# Patient Record
Sex: MALE | Race: WHITE | NOT HISPANIC OR LATINO | URBAN - METROPOLITAN AREA
[De-identification: names, ages, dates, MRNs, and addresses within clinical notes are randomized per-mention and may not be internally consistent; named-entity substitution may affect disease eponyms.]

---

## 2020-11-26 ENCOUNTER — EMERGENCY (EMERGENCY)
Facility: HOSPITAL | Age: 30
LOS: 1 days | End: 2020-11-26
Attending: EMERGENCY MEDICINE
Payer: COMMERCIAL

## 2020-11-26 VITALS
WEIGHT: 160.06 LBS | HEIGHT: 67 IN | HEART RATE: 102 BPM | TEMPERATURE: 100 F | SYSTOLIC BLOOD PRESSURE: 130 MMHG | RESPIRATION RATE: 16 BRPM | DIASTOLIC BLOOD PRESSURE: 88 MMHG | OXYGEN SATURATION: 99 %

## 2020-11-26 VITALS
TEMPERATURE: 100 F | HEART RATE: 87 BPM | SYSTOLIC BLOOD PRESSURE: 122 MMHG | RESPIRATION RATE: 16 BRPM | DIASTOLIC BLOOD PRESSURE: 80 MMHG | OXYGEN SATURATION: 98 %

## 2020-11-26 LAB
ALBUMIN SERPL ELPH-MCNC: 4.3 G/DL — SIGNIFICANT CHANGE UP (ref 3.3–5)
ALP SERPL-CCNC: 55 U/L — SIGNIFICANT CHANGE UP (ref 40–120)
ALT FLD-CCNC: 31 U/L — SIGNIFICANT CHANGE UP (ref 12–78)
ANION GAP SERPL CALC-SCNC: 7 MMOL/L — SIGNIFICANT CHANGE UP (ref 5–17)
APPEARANCE UR: CLEAR — SIGNIFICANT CHANGE UP
APTT BLD: 32.2 SEC — SIGNIFICANT CHANGE UP (ref 27.5–35.5)
AST SERPL-CCNC: 20 U/L — SIGNIFICANT CHANGE UP (ref 15–37)
BACTERIA # UR AUTO: ABNORMAL
BASOPHILS # BLD AUTO: 0 K/UL — SIGNIFICANT CHANGE UP (ref 0–0.2)
BASOPHILS NFR BLD AUTO: 0 % — SIGNIFICANT CHANGE UP (ref 0–2)
BILIRUB SERPL-MCNC: 0.5 MG/DL — SIGNIFICANT CHANGE UP (ref 0.2–1.2)
BILIRUB UR-MCNC: NEGATIVE — SIGNIFICANT CHANGE UP
BUN SERPL-MCNC: 14 MG/DL — SIGNIFICANT CHANGE UP (ref 7–23)
CALCIUM SERPL-MCNC: 8.6 MG/DL — SIGNIFICANT CHANGE UP (ref 8.5–10.1)
CHLORIDE SERPL-SCNC: 104 MMOL/L — SIGNIFICANT CHANGE UP (ref 96–108)
CK MB CFR SERPL CALC: <1 NG/ML — SIGNIFICANT CHANGE UP (ref 0–3.6)
CO2 SERPL-SCNC: 26 MMOL/L — SIGNIFICANT CHANGE UP (ref 22–31)
COLOR SPEC: YELLOW — SIGNIFICANT CHANGE UP
CREAT SERPL-MCNC: 1.4 MG/DL — HIGH (ref 0.5–1.3)
DIFF PNL FLD: ABNORMAL
EOSINOPHIL # BLD AUTO: 0 K/UL — SIGNIFICANT CHANGE UP (ref 0–0.5)
EOSINOPHIL NFR BLD AUTO: 0 % — SIGNIFICANT CHANGE UP (ref 0–6)
EPI CELLS # UR: SIGNIFICANT CHANGE UP
GLUCOSE SERPL-MCNC: 100 MG/DL — HIGH (ref 70–99)
GLUCOSE UR QL: NEGATIVE — SIGNIFICANT CHANGE UP
HCT VFR BLD CALC: 40.2 % — SIGNIFICANT CHANGE UP (ref 39–50)
HGB BLD-MCNC: 14.3 G/DL — SIGNIFICANT CHANGE UP (ref 13–17)
INR BLD: 1.3 RATIO — HIGH (ref 0.88–1.16)
KETONES UR-MCNC: ABNORMAL
LACTATE SERPL-SCNC: 1.1 MMOL/L — SIGNIFICANT CHANGE UP (ref 0.7–2)
LEUKOCYTE ESTERASE UR-ACNC: NEGATIVE — SIGNIFICANT CHANGE UP
LYMPHOCYTES # BLD AUTO: 0.4 K/UL — LOW (ref 1–3.3)
LYMPHOCYTES # BLD AUTO: 8 % — LOW (ref 13–44)
MAGNESIUM SERPL-MCNC: 2.2 MG/DL — SIGNIFICANT CHANGE UP (ref 1.6–2.6)
MANUAL SMEAR VERIFICATION: SIGNIFICANT CHANGE UP
MCHC RBC-ENTMCNC: 31.2 PG — SIGNIFICANT CHANGE UP (ref 27–34)
MCHC RBC-ENTMCNC: 35.6 GM/DL — SIGNIFICANT CHANGE UP (ref 32–36)
MCV RBC AUTO: 87.8 FL — SIGNIFICANT CHANGE UP (ref 80–100)
MONOCYTES # BLD AUTO: 0.5 K/UL — SIGNIFICANT CHANGE UP (ref 0–0.9)
MONOCYTES NFR BLD AUTO: 10 % — SIGNIFICANT CHANGE UP (ref 2–14)
NEUTROPHILS # BLD AUTO: 4 K/UL — SIGNIFICANT CHANGE UP (ref 1.8–7.4)
NEUTROPHILS NFR BLD AUTO: 67 % — SIGNIFICANT CHANGE UP (ref 43–77)
NEUTS BAND # BLD: 13 % — HIGH (ref 0–8)
NITRITE UR-MCNC: NEGATIVE — SIGNIFICANT CHANGE UP
NRBC # BLD: 0 — SIGNIFICANT CHANGE UP
NRBC # BLD: SIGNIFICANT CHANGE UP /100 WBCS (ref 0–0)
PH UR: 6 — SIGNIFICANT CHANGE UP (ref 5–8)
PLAT MORPH BLD: NORMAL — SIGNIFICANT CHANGE UP
PLATELET # BLD AUTO: 162 K/UL — SIGNIFICANT CHANGE UP (ref 150–400)
POTASSIUM SERPL-MCNC: 3.7 MMOL/L — SIGNIFICANT CHANGE UP (ref 3.5–5.3)
POTASSIUM SERPL-SCNC: 3.7 MMOL/L — SIGNIFICANT CHANGE UP (ref 3.5–5.3)
PROT SERPL-MCNC: 7.9 G/DL — SIGNIFICANT CHANGE UP (ref 6–8.3)
PROT UR-MCNC: 15
PROTHROM AB SERPL-ACNC: 15 SEC — HIGH (ref 10.6–13.6)
RAPID RVP RESULT: SIGNIFICANT CHANGE UP
RBC # BLD: 4.58 M/UL — SIGNIFICANT CHANGE UP (ref 4.2–5.8)
RBC # FLD: 11.8 % — SIGNIFICANT CHANGE UP (ref 10.3–14.5)
RBC BLD AUTO: NORMAL — SIGNIFICANT CHANGE UP
RBC CASTS # UR COMP ASSIST: SIGNIFICANT CHANGE UP /HPF (ref 0–4)
S PYO DNA THROAT QL NAA+PROBE: SIGNIFICANT CHANGE UP
SARS-COV-2 RNA SPEC QL NAA+PROBE: SIGNIFICANT CHANGE UP
SODIUM SERPL-SCNC: 137 MMOL/L — SIGNIFICANT CHANGE UP (ref 135–145)
SP GR SPEC: 1.02 — SIGNIFICANT CHANGE UP (ref 1.01–1.02)
TROPONIN I SERPL-MCNC: <.015 NG/ML — SIGNIFICANT CHANGE UP (ref 0.01–0.04)
TSH SERPL-MCNC: 0.96 UIU/ML — SIGNIFICANT CHANGE UP (ref 0.36–3.74)
UROBILINOGEN FLD QL: NEGATIVE — SIGNIFICANT CHANGE UP
VARIANT LYMPHS # BLD: 2 % — SIGNIFICANT CHANGE UP (ref 0–6)
WBC # BLD: 5 K/UL — SIGNIFICANT CHANGE UP (ref 3.8–10.5)
WBC # FLD AUTO: 5 K/UL — SIGNIFICANT CHANGE UP (ref 3.8–10.5)
WBC UR QL: SIGNIFICANT CHANGE UP

## 2020-11-26 PROCEDURE — 85025 COMPLETE CBC W/AUTO DIFF WBC: CPT

## 2020-11-26 PROCEDURE — 83605 ASSAY OF LACTIC ACID: CPT

## 2020-11-26 PROCEDURE — 87086 URINE CULTURE/COLONY COUNT: CPT

## 2020-11-26 PROCEDURE — 84443 ASSAY THYROID STIM HORMONE: CPT

## 2020-11-26 PROCEDURE — 85610 PROTHROMBIN TIME: CPT

## 2020-11-26 PROCEDURE — 84484 ASSAY OF TROPONIN QUANT: CPT

## 2020-11-26 PROCEDURE — 71045 X-RAY EXAM CHEST 1 VIEW: CPT

## 2020-11-26 PROCEDURE — 71045 X-RAY EXAM CHEST 1 VIEW: CPT | Mod: 26

## 2020-11-26 PROCEDURE — 93010 ELECTROCARDIOGRAM REPORT: CPT

## 2020-11-26 PROCEDURE — 83735 ASSAY OF MAGNESIUM: CPT

## 2020-11-26 PROCEDURE — 96360 HYDRATION IV INFUSION INIT: CPT

## 2020-11-26 PROCEDURE — 82553 CREATINE MB FRACTION: CPT

## 2020-11-26 PROCEDURE — 93005 ELECTROCARDIOGRAM TRACING: CPT

## 2020-11-26 PROCEDURE — 81001 URINALYSIS AUTO W/SCOPE: CPT

## 2020-11-26 PROCEDURE — 87798 DETECT AGENT NOS DNA AMP: CPT

## 2020-11-26 PROCEDURE — 87040 BLOOD CULTURE FOR BACTERIA: CPT

## 2020-11-26 PROCEDURE — 87651 STREP A DNA AMP PROBE: CPT

## 2020-11-26 PROCEDURE — 80053 COMPREHEN METABOLIC PANEL: CPT

## 2020-11-26 PROCEDURE — 36415 COLL VENOUS BLD VENIPUNCTURE: CPT

## 2020-11-26 PROCEDURE — 99284 EMERGENCY DEPT VISIT MOD MDM: CPT | Mod: 25

## 2020-11-26 PROCEDURE — 99284 EMERGENCY DEPT VISIT MOD MDM: CPT

## 2020-11-26 PROCEDURE — 85730 THROMBOPLASTIN TIME PARTIAL: CPT

## 2020-11-26 PROCEDURE — 0225U NFCT DS DNA&RNA 21 SARSCOV2: CPT

## 2020-11-26 RX ORDER — ACETAMINOPHEN 500 MG
650 TABLET ORAL ONCE
Refills: 0 | Status: COMPLETED | OUTPATIENT
Start: 2020-11-26 | End: 2020-11-26

## 2020-11-26 RX ORDER — SODIUM CHLORIDE 9 MG/ML
1000 INJECTION INTRAMUSCULAR; INTRAVENOUS; SUBCUTANEOUS ONCE
Refills: 0 | Status: COMPLETED | OUTPATIENT
Start: 2020-11-26 | End: 2020-11-26

## 2020-11-26 RX ADMIN — Medication 650 MILLIGRAM(S): at 12:50

## 2020-11-26 RX ADMIN — SODIUM CHLORIDE 1000 MILLILITER(S): 9 INJECTION INTRAMUSCULAR; INTRAVENOUS; SUBCUTANEOUS at 13:50

## 2020-11-26 RX ADMIN — SODIUM CHLORIDE 1000 MILLILITER(S): 9 INJECTION INTRAMUSCULAR; INTRAVENOUS; SUBCUTANEOUS at 14:12

## 2020-11-26 RX ADMIN — SODIUM CHLORIDE 1000 MILLILITER(S): 9 INJECTION INTRAMUSCULAR; INTRAVENOUS; SUBCUTANEOUS at 12:50

## 2020-11-26 NOTE — ED PROVIDER NOTE - NSFOLLOWUPINSTRUCTIONS_ED_ALL_ED_FT
A drug rash occurs when a medicine causes a change in the color or texture of the skin. It can develop minutes, hours, or days after you take the medicine. The rash may appear on a small area of skin or all over your body.      What are the causes?    This condition is usually caused by your body's reaction (allergy) to a medicine. It can also be caused by exposure to sunlight after taking a medicine that makes your skin sensitive to light.  Though any medicine can cause a rash or reaction, medicines that are more likely to cause rashes include:  •Penicillin.      •Antibiotic medicines.      •Medicines that treat seizures.      •Medicines that treat cancer (chemotherapy).      •Aspirin and other NSAIDs.      •Injectable dyes that contain iodine.      •Insulin.        What are the signs or symptoms?  Symptoms of this condition include:  •Redness.      •Tiny bumps.      •Peeling.      •Itching.      •Itchy welts (hives).      •Swelling.        How is this diagnosed?  This condition may be diagnosed based on:  •A physical exam.    •Tests to find out which medicine caused the rash. These tests may include:  •Skin tests.      •Blood tests.      •Challenge test. For this test, you stop taking all the medicines that you do not need to take. Then, you start taking them again by adding back one medicine at a time.          How is this treated?  This condition is treated with medicines, including:  •Antihistamine. This may be given to relieve itching.      •NSAIDs. These may be given to reduce swelling and to treat pain.      •A steroid medicine. This may be given to reduce swelling.      The rash usually goes away when you stop taking the medicine that caused it.      Follow these instructions at home:    •Take over-the-counter and prescription medicines only as told by your health care provider.      •Tell all your health care providers about any medicine reactions that you have had in the past.    •If your rash was caused by sensitivity to sunlight, and while your rash is healing:  •Avoid being in the sun if possible, especially when it is strongest, usually between 10 a.m. and 4 p.m.      •Cover your skin with pants, long sleeves, and a hat when you are exposed to sunlight.      •If you have hives:   •Take a cool shower or use a cool compress to relieve itchiness.      •Take over-the-counter antihistamines, as recommended by your health care provider, until the hives are gone. Hives are not contagious.        •Keep all follow-up visits as told by your health care provider. This is important.        Contact a health care provider if you have:    •A fever.      •A rash that is not going away.      •A rash that gets worse.      •A rash that comes back.      •Wheezing or coughing.        Get help right away if:    •You start to have breathing problems.      •You start to have shortness of breath.      •Your face or throat starts to swell.      •You have severe weakness with dizziness or fainting.      •You have chest pain.      These symptoms may represent a serious problem that is an emergency. Do not wait to see if the symptoms will go away. Get medical help right away. Call your local emergency services (911 in the [place] Do not drive yourself to the hospital.       Summary    •A drug rash occurs when a medicine causes a change in the color or texture of the skin. The rash may appear on a small area of skin or all over your body.      •It can develop minutes, hours, or days after you take the medicine.      •Your health care provider will do various tests to determine what medicine caused your rash.      •The rash may be treated with medicine to relieve itching, swelling, and pain.      This information is not intended to replace advice given to you by your health care provider. Make sure you discuss any questions you have with your health care provider.      Document Revised: 11/30/2018 Document Reviewed: 11/08/2018    [employer] Patient Education © 2020 [employer]

## 2020-11-26 NOTE — ED PROVIDER NOTE - CLINICAL SUMMARY MEDICAL DECISION MAKING FREE TEXT BOX
syncope, due to fever, dehydration, f/u ekg, labs, diffuse rash, blanching, finished course of bactrim, bari drug rash, f/u cultures, chest xray, strep, covid, iv fluids, tylenol, re-eval

## 2020-11-26 NOTE — ED PROVIDER NOTE - PATIENT PORTAL LINK FT
You can access the FollowMyHealth Patient Portal offered by [facility] by registering at the following website: [email or web address]. By joining [facility]’s FollowMyHealth portal, you will also be able to view your health information using other applications (apps) compatible with our system.

## 2020-11-26 NOTE — ED ADULT NURSE NOTE - OBJECTIVE STATEMENT
Pt presents to the Ed via ambulance from home s/p syncopal episode, diffuse body rash after completing a course of po antimitotics = Bactrim. EKG done, pt placed on cardiac monitor. Pt's rash is not itchy.

## 2020-11-26 NOTE — ED PROVIDER NOTE - PROGRESS NOTE DETAILS
patient feeling well, no acute distress, tolerating po, labs, chest xray reviwed,no acute findings, to dc home, understands to return to ER for worsening of symptoms

## 2020-11-26 NOTE — ED ADULT NURSE NOTE - NSIMPLEMENTINTERV_GEN_ALL_ED
Implemented All Universal Safety Interventions:  [place] to call system. Call bell, personal items and telephone within reach. Instruct patient to call for assistance. Room bathroom lighting operational. Non-slip footwear when patient is off stretcher. Physically safe environment: no spills, clutter or unnecessary equipment. Stretcher in lowest position, wheels locked, appropriate side rails in place.

## 2020-11-27 LAB
CULTURE RESULTS: NO GROWTH — SIGNIFICANT CHANGE UP
SPECIMEN SOURCE: SIGNIFICANT CHANGE UP

## 2020-12-01 LAB
CULTURE RESULTS: SIGNIFICANT CHANGE UP
CULTURE RESULTS: SIGNIFICANT CHANGE UP
SPECIMEN SOURCE: SIGNIFICANT CHANGE UP
SPECIMEN SOURCE: SIGNIFICANT CHANGE UP

## 2022-06-29 NOTE — ED PROVIDER NOTE - SKIN TURGOR
This patient is being seen in consultation from Sandor Brock DO    CHIEF COMPLAINT(S):   Chief Complaint   Patient presents with   • Right Elbow - Pain       HISTORY OF PRESENT ILLNESS:  Sarkis Jonas is a 55 year old right handed male presenting to the clinic with a complaint of right upper arm/elbow pain.  He states that 3 weeks ago he spent 5 hours using a spade while building a patio and began to have right arm pain toward the end.  Two days later during a workout he noticed something was not right with the elbow/biceps. At present, he rates his intermittent pain at a 5/10 on the pain scale, describes the pain as aching, sharp and dull, and localizes the pain to the medial aspect of the distal biceps.  He denies any neurological symptoms.  He states that anything involving bicep activation will increase his pain.  He is not currently icing, and he is currently not taking anything for pain relief.  He denies any previous injuries to his right arm/bicep/elbow.    Accompanied by self  Location: right elbow  Date of Onset: 3 weeks  Work related: No  Context: see above  Severity:5/10 at its worst when engaging bicep  Timing: intermittent   Quality: aching and sharp and sometimes dull  Associated signs and symptoms: none  Aggravating factors: bicep engagement  Alleviating factors: rest  Patient occupation/activity level: moderate     COVID-19 History  Covid Vaccine Status: No  Any previous instances of testing positive for COVID-19: No  Date(s): N/A  Any previous instances of being presumed positive for COVID-19: Yes  Date(s): March 2020  Symptoms experienced: fever and breathing issues, body aches , muscle aches  Are these symptoms resolved: Yes  If No, which symptoms are persisting: none    REVIEW OF SYSTEMS:  Skin: No problems with hair or nails. No rash. No new skin lesions.  HEENT: Pt denies problems with head, eyes, ears, nose, mouth, throat and neck  Respiratory: No coughing, wheezing, changes in voice, nor  shortness of breath  Cardiovascular: No chest pain, palpitations or other cardiac complaints noted  Gastrointestinal: No diarrhea, constipation, abdominal pain or other complaints noted  Genitourinary: Pt denies urinary pain, bleeding and voiding problems  Musculoskeletal: Pain located in right bicep and aggravated by use  Neurologic: Pt denies syncope, seizures, paralysis, involuntary movements or gait problems  Psychiatric: Pt denies sleep, anxiety, depression, sexual and other psychiatric problems  Hematologic/Lymphatic/Immunologic: Pt denies hematological, lymphatic and immunological problems  Endocrine: Pt denies thyroid and diabetic problems    Is the patient on an anti-coagulant: No  Date of last GFR test: 5/14/2021  Last GFR value: >60      Past Medical History Updated: Yes  PAST MEDICAL HISTORY:  Past Medical History:   Diagnosis Date   • Hypertension 2/7/2011   • Mixed hyperlipidemia 9/19/2019       Past Surgical History Updated: Yes   PAST SURGICAL HISTORY:  Past Surgical History:   Procedure Laterality Date   • Fracture surgery         Past Family History Updated: Yes   FAMILY HISTORY:  History reviewed. No pertinent family history.    Past Social History Updated: Yes  SOCIAL HISTORY:  Social History     Socioeconomic History   • Marital status: /Civil Union     Spouse name: Not on file   • Number of children: Not on file   • Years of education: Not on file   • Highest education level: Not on file   Occupational History   • Not on file   Tobacco Use   • Smoking status: Never Smoker   • Smokeless tobacco: Never Used   Vaping Use   • Vaping Use: Not on file   Substance and Sexual Activity   • Alcohol use: Yes   • Drug use: Never   • Sexual activity: Yes     Partners: Female   Other Topics Concern   • Not on file   Social History Narrative   • Not on file     Social Determinants of Health     Financial Resource Strain: Not on file   Food Insecurity: Not on file   Transportation Needs: Not on file    Physical Activity: Not on file   Stress: Not on file   Social Connections: Not on file   Intimate Partner Violence: Not on file       MEDICATIONS:  Current Outpatient Medications   Medication Sig Dispense Refill   • meloxicam (MOBIC) 7.5 MG tablet Take 1 tablet by mouth daily. 10 tablet 0   • lisinopril (ZESTRIL) 20 MG tablet Take 1 tablet by mouth daily. 90 tablet 3   • albuterol (PROAIR RESPICLICK) 108 (90 Base) MCG/ACT inhaler Inhale 2 puffs into the lungs every 4 hours as needed for Shortness of Breath or Wheezing. 1 Inhaler 12     No current facility-administered medications for this visit.       ALLERGIES:   ALLERGIES:  Patient has no known allergies.    Roomed By: Kishan Goldstein RN    VITAL SIGNS:  Visit Vitals  BP (!) 160/90   Pulse 80   Ht 6' 3\" (1.905 m)   Wt 93 kg (205 lb)   BMI 25.62 kg/m²       EXAM:  This is a 55 year old male, awake, alert, and cooperative. He is well nourished, well developed, and in no apparent distress.    Patient's respirations are regular and unlabored.    General Neurologic: Oriented x 3 with normal mood and affect.     Skin:    Head, neck, and extremity skin is intact without rashes or lesions.      Right Elbow ROM  Elbow motion is normal but painful with elbow flexion.    Left Elbow ROM  Elbow motion is normal and pain free.    Right Wrist ROM  Wrist motion is normal and pain free.    Left Wrist ROM  Wrist motion is normal and pain free.    Right Palpation Exam  Tenderness over the distal biceps tendon through onto the medial aspect of the biceps muscle belly    Left Palpation Exam  No pain on palpation    Right Elbow  Surgical scars are absent.  No effusion noted.  Cubital tunnel bend test is negative  No UCL ligamentous instability appreciated.  No LUCL ligamentous instability noted.  Cubital Tinel's (Percussion) test is negative.  Tennis Elbow test is negative.  Golfer's Elbow test is negative.  Radial capitellar grind test is negative.  Valgus extenstion overload testing  is negative.  No resisted long finger extension pain on exam.      Left Elbow  Surgical scars are absent.  No effusion noted.  Cubital tunnel bend test is negative  No UCL ligamentous instability appreciated.  No LUCL ligamentous instability noted.  Cubital Tinel's (Percussion) test is negative.  Tennis Elbow test is negative.  Golfer's Elbow test is negative.  Radial capitellar grind test is negative.  Valgus extenstion overload testing is negative.  No resisted long finger extension pain on exam.    Neurologic Exam  Right Strength  Elbow flexion strength is  5/5.  Elbow extension strength is 5/5.  Wrist flexion strength is 5/5.  Wrist extension strength is 5/5.   strength is 5/5.  Intrinsics strength is 5/5.  EPL strength is 5/5.  Pinch mechanism is normal .    Left Strength  Elbow flexion strength is 5/5.  Elbow extension strength is 5/5.  Wrist flexion strength is 5/5.  Wrist extension strength is 5/5.   strength is 5/5.  Intrinsics strength is 5/5.  EPL strength is 5/5.  Pinch mechanism is normal.    Right Elbow Sensation  Medial ABC demonstates normal sensation.  Lateral ABC demonstates normal  sensation.  Posterior ABC (radial) demonstrates normal sensation.  Medial brachial cutaneous demonstrates normal sensation.    Left Elbow Sensation  Medial ABC demonstates normal sensation.  Lateral ABC demonstates normal  sensation.  Posterior ABC (radial) demonstrates normal sensation.  Medial brachial cutaneous demonstrates normal sensation.    Right Wrist Sensation  Median nerve demonstates normal sensation.  Ulnar nerve demonstrates normal sensation.  Radial nerve demonstrates normal sensation.    Left Wrist Sensation  Median nerve demonstates normal sensation.  Ulnar nerve demonstrates normal sensation.  Radial nerve demonstrates normal sensation.    Right Reflex Exam  Biceps reflex is normal .  Brachioradialis reflex is normal .  Triceps reflex is normal .    Left Reflex Exam  Biceps reflex is normal  .  Brachioradialis reflex is normal .  Triceps reflex is normal.    Right Vascular Exam  Radial pulse is normal.  Edema is absent.  Capillary fill is normal.  Jaime's Test is normal.  No evidence of lymphedema.    Left Vascular Exam  Radial pulse is normal.  Edema is absent.  Capillary fill is normal.  Jaime's Test is normal.  No evidence of lymphedema.       IMAGING &TEST:  X-rays of the right elbow in 3 views from 6/30/2022 were reviewed and analyzed.  These show degenerative changes but no acute bony abnormalities.     ASSESSMENT & PLAN:  Sarkis Jonas is a 55 year old male with right medial elbow pain secondary to elbow straining and biceps tendinitis.  He received a prescription for Mobic and for physical therapy.  He was advised to ice 3 times a day for 20 minutes at a time.  He may take Tylenol as needed for pain.  I reached out to his primary care physician in regards to his still elevated blood pressure despite taking his lisinopril.    Restrictions: Use as tolerated    The patient will follow up with Sandor Brock DO or us in 6 week(s)    Right elbow pain  (primary encounter diagnosis)  Plan: XR ELBOW 3 VIEWS RIGHT, SERVICE TO PHYSICAL         THERAPY, meloxicam (MOBIC) 7.5 MG tablet    Elbow strain, right, initial encounter  Plan: SERVICE TO PHYSICAL THERAPY, meloxicam (MOBIC)         7.5 MG tablet    Biceps tendinitis on right  Plan: SERVICE TO PHYSICAL THERAPY, meloxicam (MOBIC)         7.5 MG tablet    Mixed hyperlipidemia    Primary hypertension      Electronically Signed by:  Ameya Salcido MD 06/30/22    This note was shared with Sandor Brock DO     This note was created using the Dragon voice recognition system. Errors in content may be related to improper recognition of the system. Effort to review and correct the note has been made but irregularities may still be present.             resilient/elastic

## 2024-02-23 NOTE — ED PROVIDER NOTE - OBJECTIVE STATEMENT
Continued Stay SW/CM Assessment/Plan of Care Note       Active Substitute Decision Maker (SDM)       Mera Prather Surrogate Primary Contact - Sister   Primary Phone: [phone] (Mobile)                     Progress note:  SW verified through [facility] referrals were reviewed, [facility] can accept, sw called and left message for Liasion Anna at [facility] and [facility]. SW awaiting call back.     12:23 PM   [facility].   12:29 PM   SW called and spoke to pt's sister:SDM she said patient would be for short term rehab, and sw also let her know [facility] also [facility].   SW called and left message for Anna- liaison from [facility] and informed her pt would only go for rehab and not long term care.   12:32 PM   SW reviewed consult for - pt needs sdm. SW reviewed chart and sister Mera was established as SDM.  12:38 PM   SW spoke to [facility] liaison, she is confirming from apartment that patient is able to return after DARIO.   1:43 PM   SW received confirmation from [facility] that they will have a bed today/over the weekend for PA and it may change depending when pt is ready for dc. SW spoke to RN and informed OT rec AIR. SW to reach out to attending for PM&R consult.   4:17 PM   PM&R has been put on consult.    30 male presents to ER by ambulance with report of syncope. Patient states 12 days ago he had a possible bug bite to left forearm that had gotten infected, placed and bactrim ds for 10 days, last dose today, states that infection improved but last night developed throat discomfort, body aches and this morning noticed a rash throught his body, had fever tmax 102F, decreased appetite, felt light headed and dizziness when he stood up, sat down, placed head on table and passed out, states his mother called ambulance.